# Patient Record
(demographics unavailable — no encounter records)

---

## 2018-01-18 NOTE — DIAGNOSTIC IMAGING REPORT
KUB



CLINICAL HISTORY: R10.9 Flank painR10.31 Bilateral lower abdominal

[id number]    



COMPARISON STUDY:  No previous studies for comparison. 



FINDINGS: The soft tissues, psoas shadows, renal outlines and intestinal gas

pattern appear normal. There is no evidence for bowel obstruction. No abnormal

abdominal calcifications are seen.



IMPRESSION:  Normal study. 













The above report was generated using voice recognition software.  It may contain

grammatical, syntax or spelling errors.







Electronically signed by:  Gagan Garrison M.D.

1/18/2018 11:59 AM



Dictated Date/Time:  1/18/2018 11:59 AM